# Patient Record
Sex: MALE | URBAN - METROPOLITAN AREA
[De-identification: names, ages, dates, MRNs, and addresses within clinical notes are randomized per-mention and may not be internally consistent; named-entity substitution may affect disease eponyms.]

---

## 2017-01-17 ENCOUNTER — OFFICE VISIT (OUTPATIENT)
Dept: INTERNAL MEDICINE | Facility: CLINIC | Age: 42
End: 2017-01-17

## 2017-01-17 VITALS
SYSTOLIC BLOOD PRESSURE: 140 MMHG | WEIGHT: 196.1 LBS | HEART RATE: 85 BPM | DIASTOLIC BLOOD PRESSURE: 90 MMHG | BODY MASS INDEX: 30.24 KG/M2 | RESPIRATION RATE: 16 BRPM

## 2017-01-17 DIAGNOSIS — F90.0 ATTENTION DEFICIT HYPERACTIVITY DISORDER (ADHD), PREDOMINANTLY INATTENTIVE TYPE: Primary | ICD-10-CM

## 2017-01-17 RX ORDER — BUPROPION HYDROCHLORIDE 150 MG/1
150 TABLET ORAL EVERY MORNING
Qty: 30 TABLET | Refills: 3 | Status: SHIPPED | OUTPATIENT
Start: 2017-01-17

## 2017-01-17 ASSESSMENT — PAIN SCALES - GENERAL: PAINLEVEL: NO PAIN (0)

## 2017-01-17 NOTE — NURSING NOTE
Chief Complaint   Patient presents with     A.D.H.PRITI     pt is here for a ADHD follow up        Patt Dickinson CMA at 3:47 PM on 1/17/2017

## 2017-01-17 NOTE — PROGRESS NOTES
"Internal Medicine PCC Progress Note   01/17/2017    Vu Pace MRN# [id number]   Age: 41 year old YOB: 1975          Assessment and Plan:     Vu Pace is a 41 year old y/o M with prior tobacco abuse, ADD, gout, and HTN; who presents today for follow up.     ## Depression/Anxiety: reluctant for therapy or SSRI. Feel is related to winter months. Is willing to try wellbutrin as could help with concentration and wt.     ## ADD:  Has been taking adderall sparingly due to blood pressure. Discussed trying wellbutrin as may help with concentration, mood, and wt loss. Patient in agreement.   --Wellbutrin 150 mg daily     ## HTN: 140/90 today. Continues on amlodipine.     ## Gout: reports has not been taking allopurinol. Had a lot of \"bad foods\" and etoh over the holidays. Was concerned having a gouty attack so came off the medication. Symptoms no resolved. Advised to start back up. Will recheck uric acid in 4-6 weeks.     Patient was seen and discussed with Dr. Candido Chong  PGY-3    [phone]    Attending Addendum:  Patient seen and examined with resident in clinic today.  Pertinent portions of history and exam were independently verified by myself.  I agree with the exam and plan as outlined above with the following modifications: none.  Jessie Rey MD  Internal Medicine         Subjective:     Vu Pace is a 41 year old y/o M with prior tobacco abuse, ADD, gout, and HTN; who presents today for follow up.     Since last visit, has gained a few pounds. Is frustrated by this. Has not been able to get started on an exercise plan. Would be too bored at a gym. Has been working from home, which promotes even lower activity level for him. Again as he has stated in the past, hopeful with warmer weather he will be able to get outside.     Still has had depressed mood. Notes this is due to winter. Had discussions regarding treatment options. Reluctant for trying counseling or medications. "     Notes his diet has been really bad. Snacks a lot when working from home. Eats more when he is anxious or stressed. These are usually times when he would have smoked in the past.     Has been off of allopurinol over the holidays as he was concerned he was having an attack. This has since resolved but he has not restarted taking.     ROS: 4 system ROS was done. Pertinent positive and negatives noted above.         Physical Exam:   Vitals:  Pulse:  [85] 85  Resp:  [16] 16  BP: (140)/(90) 140/90 mmHg      Wt Readings from Last 4 Encounters:   01/17/17 88.95 kg (196 lb 1.6 oz)   11/29/16 87.952 kg (193 lb 14.4 oz)   11/02/16 87.454 kg (192 lb 12.8 oz)   10/05/16 86.637 kg (191 lb)       Gen: alert, somewhat down appearing, no distress  HEENT: nc/at, no conjunctival injection  Resp: breathing comfortably on room air  Neurological: alert and oriented x4           Laboratory & Imaging Data:       None

## 2017-01-17 NOTE — PATIENT INSTRUCTIONS
Primary Care Center Phone Number [phone]  [facility] Medication Refill Request Information:  * Please contact your pharmacy regarding ANY request for medication refills.  ** [facility] Prescription Fax = [phone]  * Please allow 3 business days for routine medication refills.  * Please allow 5 business days for controlled substance medication refills.     [facility] Test Result notification information:  *You will be notified with in 7-10 days of your appointment day regarding the results of your test.  If you are on MyChart you will be notified as soon as the provider has reviewed the results and signed off on them.        1. Will only have healthy snacks between meals, either fruits or vegetables, almonds    2. Get 10,000 steps 4-5 times per week    3. Consider free weights at home.

## 2017-08-03 ENCOUNTER — OFFICE VISIT (OUTPATIENT)
Dept: INTERNAL MEDICINE | Facility: CLINIC | Age: 42
End: 2017-08-03

## 2017-08-03 VITALS
HEART RATE: 86 BPM | WEIGHT: 192.7 LBS | SYSTOLIC BLOOD PRESSURE: 128 MMHG | DIASTOLIC BLOOD PRESSURE: 88 MMHG | RESPIRATION RATE: 20 BRPM | BODY MASS INDEX: 29.74 KG/M2

## 2017-08-03 DIAGNOSIS — I10 BENIGN ESSENTIAL HYPERTENSION: Primary | ICD-10-CM

## 2017-08-03 ASSESSMENT — PAIN SCALES - GENERAL: PAINLEVEL: NO PAIN (0)

## 2017-08-03 NOTE — PATIENT INSTRUCTIONS
Primary Care Center Phone Number [phone]  [facility] Medication Refill Request Information:  * Please contact your pharmacy regarding ANY request for medication refills.  ** [facility] Prescription Fax = [phone]  * Please allow 3 business days for routine medication refills.  * Please allow 5 business days for controlled substance medication refills.     [facility] Test Result notification information:  *You will be notified with in 7-10 days of your appointment day regarding the results of your test.  If you are on MyChart you will be notified as soon as the provider has reviewed the results and signed off on them.

## 2017-08-03 NOTE — PROGRESS NOTES
PRIMARY CARE CENTER       SUBJECTIVE:  Vu Pace is a 42 year old male with a PMHx of gout; who comes in for BP check.    Patient here to evaluate BP due to plan to move to [place].  States he stopped taking amlodipine     Has been taking BP 2-3x week; states BP is usually 120s/80s. States highest he has seen was 140 on one occasion after coffee.     Currently no taking Aderal. States he stopped this due to HTN. Also not taking allopurinol. He was given a trial of Wellbutrin but did not take it as he does not want to be on antidepressants.     Reports feeling well. Attributes elevated BP to winter season due to mood changes.      Works as . Currently looking for job in [place].     Has noted weight gain in past year. No routine exercise, but has been playing basketball.       Medications and allergies reviewed by me today.     ROS:   Constitutional, HEENT, cardiovascular, pulmonary, gi and gu systems are negative, except as otherwise noted.      OBJECTIVE:  /88  Pulse 86  Resp 20  Wt 87.4 kg (192 lb 11.2 oz)  BMI 29.74 kg/m2   Wt Readings from Last 1 Encounters:   08/03/17 87.4 kg (192 lb 11.2 oz)       GENERAL APPEARANCE: healthy, alert and no distress     EYES: EOMI,  PERRL     HENT: mouth without ulcers or lesions       RESP: lungs clear to auscultation - no rales, rhonchi or wheezes     CV: regular rates and rhythm, normal S1 S2, no S3 or S4 and no murmur, click or rub     ABDOMEN:  soft, nontender     MS: extremities normal- no gross deformities noted     SKIN: no suspicious lesions or rashes     NEURO: mentation intact and speech normal     PSYCH: mentation appears normal. and affect normal/bright     ASSESSMENT/PLAN:    #Routine care  Up to date with immunizations. Discussed recommendation for 1x HIV testing. Planning on transferring care to [place].     #Hx Mild-moderate ADHD  #Concern for medication induced HTN.  Previously on Adderall, however  discontinued several months ago. After discussing hx HTN patient related that main reason for visit was wish to resume Adderall. He is concerned that he will not be able to concentrate enough to get a new job. He states he only intermittently takes adderall when he feels he needs it. Chart was reviewed. Patient previously on Adderall since he lived in [place]. He established care here and was seen by psychology for recommendations. Subsequently started on medication, however noted to develop HTN. Patient states he is sure this was not medication side effect due to stimulant, states he believes it was due to winter months. Also discussed use of Wellbutrin for mood and concentration with  in the past; stated he wished to decline antidepressants. We discussed that Adderall is a stimulant and can cause HTN, we discussed that review of previous BPs showed that HTN coincided with initiation of medication. Since patient is planning on moving and will not be able to be followed here for medications and BP. He will need to establish care after moving to restart medication and for close monitoring of BP.     There are no diagnoses linked to this encounter.         Leyla Mandujano MD  Aug 3, 2017    Plan of care discussed with     While the patient was in clinic, I reviewed the pertinent medical history and results.  I discussed the current findings on physical examination, as well as the patient s diagnosis and treatment plan with the resident and agree with the information as documented with the following exceptions: none.   Arie Holland MD

## 2017-08-03 NOTE — NURSING NOTE
Chief Complaint   Patient presents with     Hypertension     pt is here for a blood pressure follow up        Patt Dickinson CMA at 2:18 PM on 8/3/2017

## 2017-08-03 NOTE — MR AVS SNAPSHOT
After Visit Summary   8/3/2017    Vu Pace    MRN: [id number]           Patient Information     Date Of Birth          1975        Visit Information        Provider Department      8/3/2017 2:20 PM Leyla Mandujano MD [facility]        Care Instructions    Primary Care Center Phone Number [phone]  [facility] Medication Refill Request Information:  * Please contact your pharmacy regarding ANY request for medication refills.  ** [facility] Prescription Fax = [phone]  * Please allow 3 business days for routine medication refills.  * Please allow 5 business days for controlled substance medication refills.     [facility] Test Result notification information:  *You will be notified with in 7-10 days of your appointment day regarding the results of your test.  If you are on MyChart you will be notified as soon as the provider has reviewed the results and signed off on them.                  Follow-ups after your visit        Who to contact     Please call your clinic at [phone] to:    Ask questions about your health    Make or cancel appointments    Discuss your medicines    Learn about your test results    Speak to your doctor   If you have compliments or concerns about an experience at your clinic, or if you wish to file a complaint, please contact [facility] Patient Relations at [phone] or email us at [email or web address]         Additional Information About Your Visit        MyChart Information     [employer]t gives you secure access to your electronic health record. If you see a primary care provider, you can also send messages to your care team and make appointments. If you have questions, please call your primary care clinic.  If you do not have a primary care provider, please call [phone] and they will assist you.      [employer] is an electronic gateway that provides easy, online access to your  medical records. With [employer], you can request a clinic appointment, read your test results, renew a prescription or communicate with your care team.     To access your existing account, please contact your [facility] or call [phone] for assistance.        Care EveryWhere ID     This is your Care EveryWhere ID. This could be used by other organizations to access your [place] medical records  [id number]        Your Vitals Were     Pulse Respirations BMI (Body Mass Index)             86 20 29.74 kg/m2          Blood Pressure from Last 3 Encounters:   08/03/17 128/88   01/17/17 140/90   11/29/16 142/90    Weight from Last 3 Encounters:   08/03/17 87.4 kg (192 lb 11.2 oz)   01/17/17 89 kg (196 lb 1.6 oz)   11/29/16 88 kg (193 lb 14.4 oz)              Today, you had the following     No orders found for display       Primary Care Provider Office Phone # Fax #    Leyla Olivia Mandujano [phone]       [place] [address]        Equal Access to Services     ELIDA MITCHELL : Hadii aad ku hadasho Soomaali, waaxda luqadaha, qaybta kaalmada adeegyada, phi greenwood . So [facility] [phone].    ATENCIÓN: Si habla español, tiene a briscoe disposición servicios gratuitos de asistencia lingüística. Jose Alejandroame al [phone].    We comply with applicable federal civil rights laws and [place] laws. We do not discriminate on the basis of race, color, national origin, age, disability sex, sexual orientation or gender identity.            Thank you!     Thank you for choosing [facility]  for your care. Our goal is always to provide you with excellent care. Hearing back from our patients is one way we can continue to improve our services. Please take a few minutes to complete the written survey that you may receive in the mail after your visit with us. Thank you!             Your Updated Medication  List - Protect others around you: Learn how to safely use, store and throw away your medicines at www.disposemymeds.org.          This list is accurate as of: 8/3/17  2:53 PM.  Always use your most recent med list.                   Brand Name Dispense Instructions for use Diagnosis    allopurinol 100 MG tablet    ZYLOPRIM    180 tablet    Take 2 tablets (200 mg) by mouth daily    Acute gouty arthritis, High level of uric acid in blood       amLODIPine 5 MG tablet    NORVASC    90 tablet    Take 1 tablet (5 mg) by mouth daily    Benign essential hypertension       amphetamine-dextroamphetamine 20 MG per 24 hr capsule    ADDERALL XR    30 capsule    Take 1 capsule (20 mg) by mouth daily Needs appointment for further refills    ADD (attention deficit disorder) without hyperactivity       buPROPion 150 MG 24 hr tablet    WELLBUTRIN XL    30 tablet    Take 1 tablet (150 mg) by mouth every morning    Attention deficit hyperactivity disorder (ADHD), predominantly inattentive type       colchicine 0.6 MG tablet     60 tablet    Take 1 tablet (0.6 mg) by mouth 2 times daily    Acute gouty arthritis       IBUPROFEN PO           indomethacin 50 MG capsule    INDOCIN     Take 50 mg by mouth 2 times daily        triamcinolone 0.1 % cream    KENALOG    45 g    Apply topically 2 times daily    Psoriasis

## 2020-03-10 ENCOUNTER — HEALTH MAINTENANCE LETTER (OUTPATIENT)
Age: 45
End: 2020-03-10

## 2020-12-27 ENCOUNTER — HEALTH MAINTENANCE LETTER (OUTPATIENT)
Age: 45
End: 2020-12-27

## 2021-04-24 ENCOUNTER — HEALTH MAINTENANCE LETTER (OUTPATIENT)
Age: 46
End: 2021-04-24

## 2021-10-03 ENCOUNTER — HEALTH MAINTENANCE LETTER (OUTPATIENT)
Age: 46
End: 2021-10-03

## 2022-05-15 ENCOUNTER — HEALTH MAINTENANCE LETTER (OUTPATIENT)
Age: 47
End: 2022-05-15

## 2022-09-11 ENCOUNTER — HEALTH MAINTENANCE LETTER (OUTPATIENT)
Age: 47
End: 2022-09-11

## 2023-06-03 ENCOUNTER — HEALTH MAINTENANCE LETTER (OUTPATIENT)
Age: 48
End: 2023-06-03